# Patient Record
Sex: MALE | Race: WHITE | NOT HISPANIC OR LATINO | URBAN - METROPOLITAN AREA
[De-identification: names, ages, dates, MRNs, and addresses within clinical notes are randomized per-mention and may not be internally consistent; named-entity substitution may affect disease eponyms.]

---

## 2018-03-26 ENCOUNTER — EMERGENCY (EMERGENCY)
Facility: HOSPITAL | Age: 43
LOS: 0 days | Discharge: HOME | End: 2018-03-26

## 2018-03-26 VITALS
HEART RATE: 83 BPM | OXYGEN SATURATION: 98 % | TEMPERATURE: 99 F | RESPIRATION RATE: 18 BRPM | SYSTOLIC BLOOD PRESSURE: 123 MMHG | DIASTOLIC BLOOD PRESSURE: 66 MMHG

## 2018-03-26 DIAGNOSIS — M54.5 LOW BACK PAIN: ICD-10-CM

## 2018-03-26 DIAGNOSIS — Z79.891 LONG TERM (CURRENT) USE OF OPIATE ANALGESIC: ICD-10-CM

## 2018-03-26 DIAGNOSIS — Z79.899 OTHER LONG TERM (CURRENT) DRUG THERAPY: ICD-10-CM

## 2018-03-26 DIAGNOSIS — Z88.0 ALLERGY STATUS TO PENICILLIN: ICD-10-CM

## 2018-03-26 RX ORDER — METHOCARBAMOL 500 MG/1
1000 TABLET, FILM COATED ORAL ONCE
Qty: 0 | Refills: 0 | Status: COMPLETED | OUTPATIENT
Start: 2018-03-26 | End: 2018-03-26

## 2018-03-26 RX ORDER — METHOCARBAMOL 500 MG/1
1 TABLET, FILM COATED ORAL
Qty: 21 | Refills: 0
Start: 2018-03-26 | End: 2018-04-01

## 2018-03-26 RX ADMIN — METHOCARBAMOL 1000 MILLIGRAM(S): 500 TABLET, FILM COATED ORAL at 15:42

## 2018-03-26 NOTE — ED ADULT NURSE NOTE - OBJECTIVE STATEMENT
Lower back pain which began on Friday and worsens with activity. taking Tylenol, Naproxen and Aleve PRN at home with minimal effect.

## 2018-03-26 NOTE — ED PROVIDER NOTE - OBJECTIVE STATEMENT
41 y/o male with hx psoriatic arthritis presents to the ED c/o lower back pain describe as spasm for 3 days. no hx trauma/ numbness/ tingling/ weakness/ saddle anesthesia. no hx trauma.

## 2019-04-26 PROBLEM — M06.9 RHEUMATOID ARTHRITIS, UNSPECIFIED: Chronic | Status: ACTIVE | Noted: 2018-03-26

## 2019-05-09 PROBLEM — Z00.00 ENCOUNTER FOR PREVENTIVE HEALTH EXAMINATION: Status: ACTIVE | Noted: 2019-05-09

## 2019-06-04 ENCOUNTER — APPOINTMENT (OUTPATIENT)
Dept: SURGERY | Facility: CLINIC | Age: 44
End: 2019-06-04
Payer: COMMERCIAL

## 2019-06-04 VITALS — BODY MASS INDEX: 20.72 KG/M2 | HEIGHT: 71 IN | WEIGHT: 148 LBS

## 2019-06-04 PROCEDURE — 99243 OFF/OP CNSLTJ NEW/EST LOW 30: CPT

## 2019-06-04 NOTE — CONSULT LETTER
[Dear  ___] : Dear  [unfilled], [Courtesy Letter:] : I had the pleasure of seeing your patient, [unfilled], in my office today. [Please see my note below.] : Please see my note below. [Consult Closing:] : Thank you very much for allowing me to participate in the care of this patient.  If you have any questions, please do not hesitate to contact me. [DrWerner  ___] : Dr. SABA [FreeTextEntry3] : Respectfully,\par \par Ramiro Myers M.D., FACS\par

## 2019-06-04 NOTE — ASSESSMENT
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 There is no evidence of incarceration or strangulation, and the patient denies any symptoms of obstruction.  Examination of his right groin demonstrates a moderate to significant weakness but no obvious hernia. He only has one testicle. He may have a mild hydrocele as well. His umbilical examination is unremarkable. His current BMI is 21.\par \par I explained the pros and cons of surgery, as well as all risks, benefits, indications and alternatives of the procedure and the patient understood and agreed. Tristin was scheduled for the repair of his left inguinal hernia with mesh on Wednesday, June 26, 2019 under local with IV sedation at [facility] with presurgical testing waived.  The patient was encouraged to avoid heavy lifting and strenuous activity in the interim, of course.\par \par He smokes marijuana daily and he was encouraged to minimize this during the perioperative period. He will also hold his Enbrel for a week before this procedure and for 2 weeks thereafter.

## 2019-06-04 NOTE — PHYSICAL EXAM
[JVD] : no jugular venous distention  [Normal Breath Sounds] : Normal breath sounds [No Rash or Lesion] : No rash or lesion [Alert] : alert [Calm] : calm [de-identified] : healthy [de-identified] : normal [de-identified] : only one testicle, mild hydrocele [de-identified] : soft and flat abdomen\par  [de-identified] : large left inguinal hernia

## 2019-06-26 ENCOUNTER — OUTPATIENT (OUTPATIENT)
Dept: OUTPATIENT SERVICES | Facility: HOSPITAL | Age: 44
LOS: 1 days | Discharge: HOME | End: 2019-06-26

## 2019-06-26 ENCOUNTER — APPOINTMENT (OUTPATIENT)
Dept: SURGERY | Facility: AMBULATORY SURGERY CENTER | Age: 44
End: 2019-06-26
Payer: COMMERCIAL

## 2019-06-26 VITALS
HEART RATE: 66 BPM | TEMPERATURE: 98 F | OXYGEN SATURATION: 99 % | RESPIRATION RATE: 20 BRPM | SYSTOLIC BLOOD PRESSURE: 115 MMHG | DIASTOLIC BLOOD PRESSURE: 69 MMHG

## 2019-06-26 VITALS
OXYGEN SATURATION: 98 % | DIASTOLIC BLOOD PRESSURE: 59 MMHG | HEART RATE: 82 BPM | HEIGHT: 71 IN | TEMPERATURE: 98 F | WEIGHT: 147.93 LBS | SYSTOLIC BLOOD PRESSURE: 116 MMHG | RESPIRATION RATE: 18 BRPM

## 2019-06-26 DIAGNOSIS — Z98.890 OTHER SPECIFIED POSTPROCEDURAL STATES: Chronic | ICD-10-CM

## 2019-06-26 PROCEDURE — 49520 REREPAIR ING HERNIA REDUCE: CPT | Mod: LT

## 2019-06-26 RX ORDER — OXYCODONE AND ACETAMINOPHEN 5; 325 MG/1; MG/1
1 TABLET ORAL EVERY 4 HOURS
Refills: 0 | Status: DISCONTINUED | OUTPATIENT
Start: 2019-06-26 | End: 2019-06-26

## 2019-06-26 RX ORDER — SODIUM CHLORIDE 9 MG/ML
1000 INJECTION, SOLUTION INTRAVENOUS
Refills: 0 | Status: DISCONTINUED | OUTPATIENT
Start: 2019-06-26 | End: 2019-07-11

## 2019-06-26 RX ORDER — TRAMADOL HYDROCHLORIDE 50 MG/1
1 TABLET ORAL
Qty: 20 | Refills: 0
Start: 2019-06-26 | End: 2019-06-30

## 2019-06-26 RX ORDER — ETANERCEPT 25 MG
0 VIAL (EA) SUBCUTANEOUS
Qty: 0 | Refills: 0 | DISCHARGE

## 2019-06-26 RX ORDER — HYDROMORPHONE HYDROCHLORIDE 2 MG/ML
0.5 INJECTION INTRAMUSCULAR; INTRAVENOUS; SUBCUTANEOUS
Refills: 0 | Status: DISCONTINUED | OUTPATIENT
Start: 2019-06-26 | End: 2019-06-26

## 2019-06-26 RX ORDER — ONDANSETRON 8 MG/1
4 TABLET, FILM COATED ORAL ONCE
Refills: 0 | Status: DISCONTINUED | OUTPATIENT
Start: 2019-06-26 | End: 2019-07-11

## 2019-06-26 RX ADMIN — SODIUM CHLORIDE 100 MILLILITER(S): 9 INJECTION, SOLUTION INTRAVENOUS at 13:03

## 2019-06-26 NOTE — CHART NOTE - NSCHARTNOTEFT_GEN_A_CORE
PACU ANESTHESIA ADMISSION NOTE      Procedure: Repair of recurrent reducible inguinal hernia using mesh: Left    Post op diagnosis:  Recurrent left inguinal hernia      ____  Intubated  TV:______       Rate: ______      FiO2: ______    _x___  Patent Airway    _x___  Full return of protective reflexes    _x___  Full recovery from anesthesia / back to baseline status    Vitals:  T(C): 36.8  HR: 82  BP: 116/59  RR: 18  SpO2: 98%    Mental Status:  _x___ Awake   _____ Alert   _____ Drowsy   _____ Sedated    Nausea/Vomiting:  _x___  NO       ______Yes,   See Post - Op Orders         Pain Scale (0-10):  __0___    Treatment: _x___ None    ____ See Post - Op/PCA Orders    Post - Operative Fluids:   __x__ Oral   ____ See Post - Op Orders    Plan: Discharge:   _x___Home       _____Floor     _____Critical Care    _____  Other:_________________    Comments:  No anesthesia issues or complications noted.  Discharge when criteria met.

## 2019-06-26 NOTE — BRIEF OPERATIVE NOTE - NSICDXBRIEFPROCEDURE_GEN_ALL_CORE_FT
PROCEDURES:  Repair of recurrent reducible inguinal hernia using mesh 26-Jun-2019 12:49:42 Left Ramiro Myers

## 2019-06-26 NOTE — ASU DISCHARGE PLAN (ADULT/PEDIATRIC) - COMMENTS
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

## 2019-06-26 NOTE — ASU DISCHARGE PLAN (ADULT/PEDIATRIC) - CARE PROVIDER_API CALL
Ramiro Myers)  Surgery  [address] [facility] [facility] [address]  [place] [address]  Phone: [phone]  Fax: [phone]  Follow Up Time: 1 week

## 2019-07-01 DIAGNOSIS — M06.9 RHEUMATOID ARTHRITIS, UNSPECIFIED: ICD-10-CM

## 2019-07-01 DIAGNOSIS — K44.9 DIAPHRAGMATIC HERNIA WITHOUT OBSTRUCTION OR GANGRENE: ICD-10-CM

## 2019-07-01 DIAGNOSIS — F41.9 ANXIETY DISORDER, UNSPECIFIED: ICD-10-CM

## 2019-07-01 DIAGNOSIS — Z88.0 ALLERGY STATUS TO PENICILLIN: ICD-10-CM

## 2019-07-09 ENCOUNTER — APPOINTMENT (OUTPATIENT)
Dept: SURGERY | Facility: CLINIC | Age: 44
End: 2019-07-09
Payer: COMMERCIAL

## 2019-07-09 DIAGNOSIS — K40.90 UNILATERAL INGUINAL HERNIA, W/OUT OBSTRUCTION OR GANGRENE, NOT SPECIFIED AS RECURRENT: ICD-10-CM

## 2019-07-09 PROCEDURE — 99024 POSTOP FOLLOW-UP VISIT: CPT

## 2019-07-09 NOTE — ASSESSMENT
[FreeTextEntry1] : Tristin underwent the repair of his very large recurrent direct left inguinal hernia with mesh on June 26, 2019 under local with IV sedation without any problems or complications. His wound is clean, dry and intact. There is no evidence of erythema, seroma formation or infection. He is tolerating a diet and having normal bowel movements. He denies any significant postoperative pain or discomfort at this time. He has already returned to work.\par \par He was counseled and reassured. He was discharged from the office with no specific followup necessary, but he knows to avoid any heavy lifting or strenuous activity for the next several weeks. He will restart his Enbrel in a few weeks.

## 2019-07-09 NOTE — CONSULT LETTER
[FreeTextEntry1] : Dear Dr. Refugio Lester, \par \par I had the pleasure of seeing your patient, LISBETH YEPEZ, in my office today. Please see my note below. \par \par Thank you very much for allowing me to participate in the care of this patient. If you have any questions, please do not hesitate to contact me. \par \par \par Respectfully,\par \par Ramiro Myers M.D., FACS\par  \par \par \par \par cc: Dr. Ketty Daniels

## 2020-09-25 NOTE — ASU PATIENT PROFILE, ADULT - FALL HARM RISK TYPE OF ASSESSMENT
Admission [No Acute Distress] : no acute distress [Well Nourished] : well nourished [Well Developed] : well developed [Well-Appearing] : well-appearing [Normal Sclera/Conjunctiva] : normal sclera/conjunctiva [PERRL] : pupils equal round and reactive to light [EOMI] : extraocular movements intact [Normal Outer Ear/Nose] : the outer ears and nose were normal in appearance [Normal Oropharynx] : the oropharynx was normal [No JVD] : no jugular venous distention [No Lymphadenopathy] : no lymphadenopathy [Supple] : supple [Thyroid Normal, No Nodules] : the thyroid was normal and there were no nodules present [No Respiratory Distress] : no respiratory distress  [No Accessory Muscle Use] : no accessory muscle use [Clear to Auscultation] : lungs were clear to auscultation bilaterally [Normal Rate] : normal rate  [Regular Rhythm] : with a regular rhythm [Normal S1, S2] : normal S1 and S2 [No Murmur] : no murmur heard [No Carotid Bruits] : no carotid bruits [No Abdominal Bruit] : a ~M bruit was not heard ~T in the abdomen [No Varicosities] : no varicosities [Pedal Pulses Present] : the pedal pulses are present [No Edema] : there was no peripheral edema [No Palpable Aorta] : no palpable aorta [No Extremity Clubbing/Cyanosis] : no extremity clubbing/cyanosis [Soft] : abdomen soft [Non Tender] : non-tender [Non-distended] : non-distended [No Masses] : no abdominal mass palpated [No HSM] : no HSM [Normal Bowel Sounds] : normal bowel sounds [Normal Posterior Cervical Nodes] : no posterior cervical lymphadenopathy [Normal Anterior Cervical Nodes] : no anterior cervical lymphadenopathy [No CVA Tenderness] : no CVA  tenderness [No Spinal Tenderness] : no spinal tenderness [No Joint Swelling] : no joint swelling [Grossly Normal Strength/Tone] : grossly normal strength/tone [No Rash] : no rash [Coordination Grossly Intact] : coordination grossly intact [No Focal Deficits] : no focal deficits [Normal Gait] : normal gait [Normal Affect] : the affect was normal [Normal Insight/Judgement] : insight and judgment were intact

## 2022-01-06 NOTE — ASU DISCHARGE PLAN (ADULT/PEDIATRIC) - DISCHARGE TO
Routing refill request to provider for review/approval because:  BP Readings from Last 3 Encounters:   11/23/21 (!) 160/95   11/03/21 132/85   09/14/21 (!) 180/95           
Home

## 2022-04-04 PROBLEM — F41.9 ANXIETY DISORDER, UNSPECIFIED: Chronic | Status: ACTIVE | Noted: 2019-06-26

## 2022-05-24 ENCOUNTER — APPOINTMENT (OUTPATIENT)
Dept: UROLOGY | Facility: CLINIC | Age: 47
End: 2022-05-24
Payer: MEDICAID

## 2022-05-24 DIAGNOSIS — Z78.9 OTHER SPECIFIED HEALTH STATUS: ICD-10-CM

## 2022-05-24 DIAGNOSIS — E29.9 TESTICULAR DYSFUNCTION, UNSPECIFIED: ICD-10-CM

## 2022-05-24 DIAGNOSIS — L40.50 ARTHROPATHIC PSORIASIS, UNSPECIFIED: ICD-10-CM

## 2022-05-24 DIAGNOSIS — Z87.891 PERSONAL HISTORY OF NICOTINE DEPENDENCE: ICD-10-CM

## 2022-05-24 PROCEDURE — 99204 OFFICE O/P NEW MOD 45 MIN: CPT

## 2022-05-24 RX ORDER — ETANERCEPT 50 MG/ML
50 SOLUTION SUBCUTANEOUS
Refills: 0 | Status: ACTIVE | COMMUNITY

## 2022-05-24 RX ORDER — ETANERCEPT 25 MG/.5ML
25 SOLUTION SUBCUTANEOUS
Qty: 2 | Refills: 0 | Status: ACTIVE | COMMUNITY
Start: 2022-05-18

## 2022-05-25 NOTE — HISTORY OF PRESENT ILLNESS
[FreeTextEntry1] : LISBETH EYPEZ is a 46 year old male, with a history of psoriatic arthritis and right orchiectomy as a child for unknown reason, who presents for consultation for erectile dysfunction and decreased libido.\par \par Patient has not been sexually active for over 10 years and recently started dating.  He had difficulty both obtaining and maintaining an erection.  He tried Cialis, unknown dose, which initially helped him obtain and maintain erection however, he did not maintain.\par He obtained sildenafil 20 mg tablets, he took 2 tablets, which helped him achieve and maintain a good erection.\par Denied any adverse events with his medications.\par Reports decreased libido and generalized anxiety regarding sexual activity.\par  \par Denies gross hematuria, irritative/obstructive voiding symptoms, dysuria or associated symptoms. Denies flank pain. \par \par Denies  PMH including previous kidney stones, recurrent UTIs.\par \par Family History: No  malignancies\par \par Social History:  at a car dealership.  Former cigarette smoker quit 10 years ago smoked 1 pack/day x 8 years.  Daily marijuana smoker.  Denies EtOH abuse or illicit drug use.\par \par

## 2022-05-25 NOTE — ASSESSMENT
[FreeTextEntry1] : LISBETH YEPEZ is a 46 year old male, with a history of psoriatic arthritis and right orchiectomy as a child for unknown reason, who presents for consultation for erectile dysfunction and decreased libido.  Patient states he has good exercise tolerance and denies any shortness of breath.  Can climb 2 flights of stairs.\par \par \par Plan:\par -Hormone panel\par -Start sildenafil 20 mg, titrating from 1 to 5 tablets as needed 1 hour prior to intercourse.  All usage, dosage, mechanism of action, and adverse events of been fully reviewed.\par -Follow-up few months to review hormone results and symptom index\par \par \par Based on the patient's history, he was prescribed a PDE5 inhibior. The patient understands that the risks of this medication include facial redness, flushing, nasal congestion, GERD, back pain, priapism, chest pain/MI, dizziness, drop in BP, changes in vision. ER precautions were given. The patient has been screened for potential medication interactions. He is not on any nitrates, po antifungals or HIV meds. \par \par  I recommended that the patient take the first dose by himself. He knows that the medication requires approximately 45 minutes to have effect and works best on an empty stomach. He is aware sexual stimulation is required.\par \par \par

## 2022-05-25 NOTE — PHYSICAL EXAM
[General Appearance - Well Developed] : well developed [General Appearance - Well Nourished] : well nourished [Normal Appearance] : normal appearance [Well Groomed] : well groomed [General Appearance - In No Acute Distress] : no acute distress [Edema] : no peripheral edema [Respiration, Rhythm And Depth] : normal respiratory rhythm and effort [Exaggerated Use Of Accessory Muscles For Inspiration] : no accessory muscle use [Abdomen Soft] : soft [Abdomen Tenderness] : non-tender [Costovertebral Angle Tenderness] : no ~M costovertebral angle tenderness [Urethral Meatus] : meatus normal [Urinary Bladder Findings] : the bladder was normal on palpation [Scrotum] : the scrotum was normal [Testes Tenderness] : no tenderness of the testes [Testes Mass (___cm)] : there were no testicular masses [Normal Station and Gait] : the gait and station were normal for the patient's age [] : no rash [No Focal Deficits] : no focal deficits [Oriented To Time, Place, And Person] : oriented to person, place, and time [Affect] : the affect was normal [Mood] : the mood was normal [Not Anxious] : not anxious [Femoral Lymph Nodes Enlarged Bilaterally] : femoral [Inguinal Lymph Nodes Enlarged Bilaterally] : inguinal [FreeTextEntry1] : Absent right testicle.  Left testicle with hydrocele

## 2022-09-26 ENCOUNTER — LABORATORY RESULT (OUTPATIENT)
Age: 47
End: 2022-09-26

## 2022-09-27 ENCOUNTER — NON-APPOINTMENT (OUTPATIENT)
Age: 47
End: 2022-09-27

## 2022-09-29 ENCOUNTER — NON-APPOINTMENT (OUTPATIENT)
Age: 47
End: 2022-09-29

## 2022-09-29 LAB — SHBG SERPL-SCNC: 62.6 NMOL/L

## 2022-10-03 ENCOUNTER — APPOINTMENT (OUTPATIENT)
Dept: UROLOGY | Facility: CLINIC | Age: 47
End: 2022-10-03

## 2022-10-03 ENCOUNTER — NON-APPOINTMENT (OUTPATIENT)
Age: 47
End: 2022-10-03

## 2022-10-03 VITALS
TEMPERATURE: 98 F | SYSTOLIC BLOOD PRESSURE: 135 MMHG | BODY MASS INDEX: 20.72 KG/M2 | HEART RATE: 80 BPM | DIASTOLIC BLOOD PRESSURE: 80 MMHG | WEIGHT: 148 LBS | HEIGHT: 71 IN

## 2022-10-03 LAB
TESTOST FREE SERPL-MCNC: 9.3 PG/ML
TESTOST SERPL-MCNC: 794 NG/DL

## 2022-10-03 PROCEDURE — 99214 OFFICE O/P EST MOD 30 MIN: CPT

## 2022-10-03 RX ORDER — SILDENAFIL 20 MG/1
20 TABLET ORAL
Qty: 30 | Refills: 3 | Status: DISCONTINUED | OUTPATIENT
Start: 2022-05-24 | End: 2022-10-03

## 2022-10-03 NOTE — HISTORY OF PRESENT ILLNESS
[FreeTextEntry1] : LISBETH YEPEZ is a 46 year old male, with a history of psoriatic arthritis and right orchiectomy as a child for unknown reason, who presents for consultation for erectile dysfunction and decreased libido.  \par \par Pt reports not being able to maintain a good erection with Viagra, after taking up to 80 mg . He denies any obstructive or irritative urinary symptoms    \par Patient states he has good exercise tolerance and denies any shortness of breath.  Can climb 2 flights of stairs. has decerased libido\par \par Testosterone free 10/22 - testosterone free :9.3 pg/mL , testosterone 794 ng/dL \par sex hormone - 62.6 nmol/L\par \par previously \par Patient has not been sexually active for over 10 years and recently started dating.  He had difficulty both obtaining and maintaining an erection. 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  Daily marijuana smoker.  Denies EtOH abuse or illicit drug use.\par \par

## 2022-10-03 NOTE — ADDENDUM
[FreeTextEntry1] : Patient's note was transcribed with the assistance of a medical scribe under the supervision of Dr. Yang.\par I, Dr. Yang, have reviewed the patient's chart and agree that it aligns with my medical decisions.\par Sharri Churchill, our scribe, also served as a chaperone for physical examination purposes.\par \par \par

## 2022-10-03 NOTE — ASSESSMENT
[FreeTextEntry1] : LISBETH YEPEZ is a 46 year old male, with a history of psoriatic arthritis and right orchiectomy as a child for unknown reason, who presents for consultation for erectile dysfunction and decreased libido.  No improvement with Viagra.\par \par Testosterone normal.  Explained to the patient that this may be multifactorial erectile dysfunction with psychogenic causes.\par Recommend psych/sex counseling \par \par Plan \par -He wishes to try Cialis as an alternative.\par - f/u with  \par \par \par Based on the patient's history, he was prescribed a PDE5 inhibior. The patient understands that the risks of this medication include facial redness, flushing, nasal congestion, GERD, back pain, priapism, chest pain/MI, dizziness, drop in BP, changes in vision. ER precautions were given. The patient has been screened for potential medication interactions. He is not on any nitrates, po antifungals or HIV meds. \par \par  I recommended that the patient take the first dose by himself. He knows that the medication requires approximately 45 minutes to have effect and works best on an empty stomach. He is aware sexual stimulation is required.\par \par \par

## 2022-10-06 ENCOUNTER — NON-APPOINTMENT (OUTPATIENT)
Age: 47
End: 2022-10-06

## 2022-10-10 ENCOUNTER — NON-APPOINTMENT (OUTPATIENT)
Age: 47
End: 2022-10-10

## 2022-11-16 ENCOUNTER — APPOINTMENT (OUTPATIENT)
Dept: UROLOGY | Facility: CLINIC | Age: 47
End: 2022-11-16

## 2022-11-16 VITALS
HEART RATE: 74 BPM | BODY MASS INDEX: 20.72 KG/M2 | WEIGHT: 148 LBS | OXYGEN SATURATION: 98 % | HEIGHT: 71 IN | SYSTOLIC BLOOD PRESSURE: 113 MMHG | TEMPERATURE: 98.1 F | RESPIRATION RATE: 18 BRPM | DIASTOLIC BLOOD PRESSURE: 67 MMHG

## 2022-11-16 DIAGNOSIS — R68.82 DECREASED LIBIDO: ICD-10-CM

## 2022-11-16 PROCEDURE — 99213 OFFICE O/P EST LOW 20 MIN: CPT

## 2022-12-12 PROBLEM — R68.82 LIBIDO, DECREASED: Status: ACTIVE | Noted: 2022-05-24

## 2022-12-12 NOTE — HISTORY OF PRESENT ILLNESS
[FreeTextEntry1] : LISBETH YEPEZ is a 46 year old male, with a history of psoriatic arthritis and right orchiectomy as a child for unknown reason.  Referred by Dr. Yang for subspecialty consultation for erectile dysfunction.\par \par Patient reports that he has not been sexually active in quite a long time.  He states that when he started become sexually active again he was having a lot of stress especially regarding finances.  He states that he was having difficulty maintaining his erections.  Patient was started on sildenafil which he felt did help however he was still unable to maintain his erections.  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 Daily marijuana smoker. Denies EtOH abuse or illicit drug use.

## 2023-11-17 ENCOUNTER — APPOINTMENT (OUTPATIENT)
Dept: UROLOGY | Facility: CLINIC | Age: 48
End: 2023-11-17
Payer: COMMERCIAL

## 2023-11-17 DIAGNOSIS — N52.9 MALE ERECTILE DYSFUNCTION, UNSPECIFIED: ICD-10-CM

## 2023-11-17 DIAGNOSIS — F52.4 PREMATURE EJACULATION: ICD-10-CM

## 2023-11-17 PROCEDURE — 99213 OFFICE O/P EST LOW 20 MIN: CPT

## 2023-11-17 RX ORDER — LIDOCAINE 5 G/100G
5 OINTMENT TOPICAL
Qty: 2 | Refills: 4 | Status: ACTIVE | COMMUNITY
Start: 2023-11-17 | End: 1900-01-01

## 2023-11-20 RX ORDER — TADALAFIL 20 MG/1
20 TABLET ORAL
Qty: 15 | Refills: 10 | Status: ACTIVE | COMMUNITY
Start: 2022-10-03 | End: 1900-01-01

## 2023-11-30 NOTE — ASU PREOP CHECKLIST - BLOOD AVAILABLE
----- Message from Maryam Patrick MD sent at 11/30/2023  9:42 AM CST -----  Please notify the patient of normal results.  Follow-up as planned.  
n/a

## 2024-03-25 ENCOUNTER — NON-APPOINTMENT (OUTPATIENT)
Age: 49
End: 2024-03-25

## 2024-05-22 ENCOUNTER — APPOINTMENT (OUTPATIENT)
Dept: UROLOGY | Facility: CLINIC | Age: 49
End: 2024-05-22

## 2024-06-10 ENCOUNTER — APPOINTMENT (OUTPATIENT)
Dept: UROLOGY | Facility: CLINIC | Age: 49
End: 2024-06-10

## 2024-08-16 ENCOUNTER — APPOINTMENT (OUTPATIENT)
Dept: UROLOGY | Facility: CLINIC | Age: 49
End: 2024-08-16
Payer: COMMERCIAL

## 2024-08-16 VITALS
TEMPERATURE: 99.4 F | RESPIRATION RATE: 18 BRPM | BODY MASS INDEX: 22.4 KG/M2 | SYSTOLIC BLOOD PRESSURE: 132 MMHG | WEIGHT: 160 LBS | DIASTOLIC BLOOD PRESSURE: 74 MMHG | HEART RATE: 92 BPM | OXYGEN SATURATION: 93 % | HEIGHT: 71 IN

## 2024-08-16 DIAGNOSIS — E29.9 TESTICULAR DYSFUNCTION, UNSPECIFIED: ICD-10-CM

## 2024-08-16 DIAGNOSIS — N52.9 MALE ERECTILE DYSFUNCTION, UNSPECIFIED: ICD-10-CM

## 2024-08-16 DIAGNOSIS — F52.4 PREMATURE EJACULATION: ICD-10-CM

## 2024-08-16 DIAGNOSIS — Z12.5 ENCOUNTER FOR SCREENING FOR MALIGNANT NEOPLASM OF PROSTATE: ICD-10-CM

## 2024-08-16 DIAGNOSIS — N43.3 HYDROCELE, UNSPECIFIED: ICD-10-CM

## 2024-08-16 LAB
BILIRUB UR QL STRIP: NORMAL
COLLECTION METHOD: NORMAL
GLUCOSE UR-MCNC: NORMAL
HCG UR QL: 0.2 EU/DL
HGB UR QL STRIP.AUTO: NORMAL
KETONES UR-MCNC: NORMAL
LEUKOCYTE ESTERASE UR QL STRIP: NORMAL
NITRITE UR QL STRIP: NORMAL
PH UR STRIP: 5.5
PROT UR STRIP-MCNC: NORMAL
SP GR UR STRIP: 1.03

## 2024-08-16 PROCEDURE — 99214 OFFICE O/P EST MOD 30 MIN: CPT

## 2024-08-16 PROCEDURE — G2211 COMPLEX E/M VISIT ADD ON: CPT | Mod: NC

## 2024-08-16 PROCEDURE — 81002 URINALYSIS NONAUTO W/O SCOPE: CPT

## 2024-08-16 NOTE — HISTORY OF PRESENT ILLNESS
[FreeTextEntry1] : 49M w hx of psoriatic arthritis and right orchiectomy as a child for unknown reason, previously followed with Dr. Vincent for erectile dysfunction. He was previously started on Cialis 20mg for his ED. He also uses topical lidocaine jelly for his premature ejaculation. Today he reports that he is very satisfied with the results of Cialis 20 mg.  He has had adequate erectile rigidity for penetration.  He reports that he is no longer using topical anesthetic for his premature ejaculation as that he has become more sexually active he has been able to last longer during his intimate sexual encounters.  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 Daily marijuana smoker. Denies EtOH abuse or illicit drug use.  Labs Sept 2023 - testosterone 794

## 2024-08-16 NOTE — PLAN

## 2024-08-16 NOTE — ASSESSMENT
[FreeTextEntry1] : #Premature Ejaculation - resolved - D/c topical lidocaine  #Solitary left testicle/hydrocele - pt reportedly had a hx of right orchiectomy when he was an infant for unknown reasons. He is concerned for malignancy, no masses palpated on exam. I explained to him that testicular cancer is not typical for patients in his age group - scrotal US to evaluate for any masses -  A hydrocele is a painless buildup of watery fluid around one or both testicles that causes the scrotum or groin area to swell. This swelling may be unsightly and uncomfortable, but it usually is not painful and generally is not dangerous. No treatment is necessary unless the patient is uncomfortable and is bothered by it.   #ED - C/w Cialis 20mg prn.  Patient reports having no side effects. - medication renewed -  HbA1c - Lipid profile, C reactive protein, Apolipoprotein - Testosterone - No Plan for Trial of Intracavernosal penile injections with Trimix (Papaverine 30mg/mL, phentolamine 1mg/mL, PGE1 10 mcg/mL) -Follow-up in 6 weeks to review labs and reassess symptoms  Erectile dysfunction, its etiology, risk factors and natural history were discussed with the patient. Work-up and empiric management were discussed. From least to most invasive, treatments including behavioral changes (weight loss, exercise, improved sleep), oral PDE5i, vacuum erection device, intraurethral pellets, intracavernosal injections, and penile prosthetic implantation were described. Relevant individual risks and benefits disclosed. I explained that there are different causes for ED including psychogenic, vasculogenic, neurogenic and medication side-effect related causes. Oftentimes there are multiple causes.   The patient understands that the risks of PDE5is include facial redness, flushing, GERD, back pain, priapism, chest pain/MI, arrhythmia, dizziness, drop in BP, impaired vision and loss of hearing. He understands that the medication may take up to an hour to function and requires sexual stimulation. He was told not to take his medication within 4 hours of alpha blockers, or not at all if ever taking nitroglycerin. Both sildenafil and vardenafil, but not tadalafil, have some cross-reactivity with PDE6 and thus may produce visual side effects. He also was told to go to the ER immediately if he noticed any blindness or visual changes, or for any painful erection lasting > 4 hrs. He denies any history nitrate medication use for angina.

## 2024-09-23 ENCOUNTER — APPOINTMENT (OUTPATIENT)
Dept: UROLOGY | Facility: CLINIC | Age: 49
End: 2024-09-23
Payer: COMMERCIAL

## 2024-09-23 DIAGNOSIS — Z12.5 ENCOUNTER FOR SCREENING FOR MALIGNANT NEOPLASM OF PROSTATE: ICD-10-CM

## 2024-09-23 DIAGNOSIS — N52.9 MALE ERECTILE DYSFUNCTION, UNSPECIFIED: ICD-10-CM

## 2024-09-23 DIAGNOSIS — N43.3 HYDROCELE, UNSPECIFIED: ICD-10-CM

## 2024-09-23 DIAGNOSIS — E29.9 TESTICULAR DYSFUNCTION, UNSPECIFIED: ICD-10-CM

## 2024-09-23 DIAGNOSIS — F52.4 PREMATURE EJACULATION: ICD-10-CM

## 2024-09-23 PROCEDURE — 99214 OFFICE O/P EST MOD 30 MIN: CPT

## 2024-09-23 PROCEDURE — G2211 COMPLEX E/M VISIT ADD ON: CPT | Mod: NC

## 2024-09-23 NOTE — PLAN

## 2024-09-23 NOTE — ASSESSMENT
[FreeTextEntry1] : #Solitary left testicle/hydrocele - pt reportedly had a hx of right orchiectomy when he was an infant for unknown reasons. He is concerned for malignancy, no masses palpated on exam. I explained to him that testicular cancer is not typical for patients in his age group - scrotal US to evaluate for any masses - pt was unable to perform prior to this visit - A hydrocele is a painless buildup of watery fluid around one or both testicles that causes the scrotum or groin area to swell. This swelling may be unsightly and uncomfortable, but it usually is not painful and generally is not dangerous. No treatment is necessary unless the patient is uncomfortable and is bothered by it.  #ED - likely 2/2 to chronic smoking hx. Pt educated on all adverse effects of smoking but declines to quit at this time.  - C/w Cialis 20mg prn. Patient reports having no side effects. - medication renewed - HbA1c wnl - Lipid profile - elevated LDL - Testosterone 789 - No Plan for Trial of Intracavernosal penile injections with Trimix  Follow up in 1 year to reassess symptoms and PSA screening. Will call in sooner if any concerning findings on scrotal US.   The patient understands that the risks of PDE5is include facial redness, flushing, GERD, back pain, priapism, chest pain/MI, arrhythmia, dizziness, drop in BP, impaired vision and loss of hearing. He understands that the medication may take up to an hour to function and requires sexual stimulation. He was told not to take his medication within 4 hours of alpha blockers, or not at all if ever taking nitroglycerin. Both sildenafil and vardenafil, but not tadalafil, have some cross-reactivity with PDE6 and thus may produce visual side effects. He also was told to go to the ER immediately if he noticed any blindness or visual changes, or for any painful erection lasting > 4 hrs. He denies any history nitrate medication use for angina.  #Premature Ejaculation - resolved - D/c topical lidocaine

## 2024-09-23 NOTE — HISTORY OF PRESENT ILLNESS
[FreeTextEntry1] : 49M w hx of psoriatic arthritis and right orchiectomy as a child for unknown reason, previously followed with Dr. Vincent for erectile dysfunction. He was previously started on Cialis 20mg for his ED. He also uses topical lidocaine jelly for his premature ejaculation. Today he reports that he is very satisfied with the results of Cialis 20 mg. He has had adequate erectile rigidity for penetration. He reports that he is no longer using topical anesthetic for his premature ejaculation as that he has become more sexually active he has been able to last longer during his intimate sexual encounters.  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 Daily marijuana smoker. Denies EtOH abuse or illicit drug use.  Labs Sept 2022 - testosterone 794  Office visit 09/23/2024 Pt presents today to review labs and symptoms. Pt is currently has no hypogonadal sx. He has been taking Cialis 20mg and is satisfied with results thus far.  Labs reviewed below with patient from 9/2024, Total Testosterone - 789 Estradiol - 32 PSA - 0.82 Vit D - 50 HbA1c - 5.6 Lipid panel - elevated LDL